# Patient Record
Sex: FEMALE | Race: WHITE | NOT HISPANIC OR LATINO | URBAN - METROPOLITAN AREA
[De-identification: names, ages, dates, MRNs, and addresses within clinical notes are randomized per-mention and may not be internally consistent; named-entity substitution may affect disease eponyms.]

---

## 2017-03-01 ENCOUNTER — OFFICE VISIT - HEALTHEAST (OUTPATIENT)
Dept: INTERNAL MEDICINE | Facility: CLINIC | Age: 57
End: 2017-03-01

## 2017-03-01 DIAGNOSIS — J11.1 INFLUENZA-LIKE ILLNESS: ICD-10-CM

## 2017-03-01 DIAGNOSIS — E66.09 NON MORBID OBESITY DUE TO EXCESS CALORIES: ICD-10-CM

## 2017-03-01 DIAGNOSIS — F41.9 ANXIETY: ICD-10-CM

## 2017-03-01 ASSESSMENT — MIFFLIN-ST. JEOR: SCORE: 1293.03

## 2017-03-02 ENCOUNTER — OFFICE VISIT - HEALTHEAST (OUTPATIENT)
Dept: INTERNAL MEDICINE | Facility: CLINIC | Age: 57
End: 2017-03-02

## 2017-03-02 DIAGNOSIS — F33.0 MILD EPISODE OF RECURRENT MAJOR DEPRESSIVE DISORDER (H): ICD-10-CM

## 2017-03-02 DIAGNOSIS — F41.9 ANXIETY: ICD-10-CM

## 2017-03-02 ASSESSMENT — MIFFLIN-ST. JEOR: SCORE: 1288.49

## 2017-03-28 ENCOUNTER — COMMUNICATION - HEALTHEAST (OUTPATIENT)
Dept: SCHEDULING | Facility: CLINIC | Age: 57
End: 2017-03-28

## 2017-03-29 ENCOUNTER — COMMUNICATION - HEALTHEAST (OUTPATIENT)
Dept: INTERNAL MEDICINE | Facility: CLINIC | Age: 57
End: 2017-03-29

## 2017-04-10 ENCOUNTER — COMMUNICATION - HEALTHEAST (OUTPATIENT)
Dept: INTERNAL MEDICINE | Facility: CLINIC | Age: 57
End: 2017-04-10

## 2017-04-10 ENCOUNTER — OFFICE VISIT - HEALTHEAST (OUTPATIENT)
Dept: INTERNAL MEDICINE | Facility: CLINIC | Age: 57
End: 2017-04-10

## 2017-04-10 DIAGNOSIS — M79.603 ARM PAIN: ICD-10-CM

## 2017-04-10 ASSESSMENT — MIFFLIN-ST. JEOR: SCORE: 1322.51

## 2017-04-12 ENCOUNTER — COMMUNICATION - HEALTHEAST (OUTPATIENT)
Dept: INTERNAL MEDICINE | Facility: CLINIC | Age: 57
End: 2017-04-12

## 2017-04-28 ENCOUNTER — RECORDS - HEALTHEAST (OUTPATIENT)
Dept: ADMINISTRATIVE | Facility: OTHER | Age: 57
End: 2017-04-28

## 2017-05-05 ENCOUNTER — COMMUNICATION - HEALTHEAST (OUTPATIENT)
Dept: INTERNAL MEDICINE | Facility: CLINIC | Age: 57
End: 2017-05-05

## 2017-07-21 ENCOUNTER — AMBULATORY - HEALTHEAST (OUTPATIENT)
Dept: MULTI SPECIALTY CLINIC | Facility: CLINIC | Age: 57
End: 2017-07-21

## 2017-07-21 LAB
HPV_EXT - HISTORICAL: NORMAL
PAP SMEAR - HIM PATIENT REPORTED: NORMAL

## 2018-09-13 ENCOUNTER — OFFICE VISIT - HEALTHEAST (OUTPATIENT)
Dept: INTERNAL MEDICINE | Facility: CLINIC | Age: 58
End: 2018-09-13

## 2018-09-13 DIAGNOSIS — F33.0 MILD EPISODE OF RECURRENT MAJOR DEPRESSIVE DISORDER (H): ICD-10-CM

## 2018-09-13 DIAGNOSIS — Z00.00 HEALTHCARE MAINTENANCE: ICD-10-CM

## 2018-09-13 ASSESSMENT — MIFFLIN-ST. JEOR: SCORE: 1327.04

## 2018-10-16 ENCOUNTER — COMMUNICATION - HEALTHEAST (OUTPATIENT)
Dept: INTERNAL MEDICINE | Facility: CLINIC | Age: 58
End: 2018-10-16

## 2018-10-17 ENCOUNTER — AMBULATORY - HEALTHEAST (OUTPATIENT)
Dept: INTERNAL MEDICINE | Facility: CLINIC | Age: 58
End: 2018-10-17

## 2018-10-17 DIAGNOSIS — Z22.7 LATENT TUBERCULOSIS INFECTION: ICD-10-CM

## 2018-10-24 ENCOUNTER — COMMUNICATION - HEALTHEAST (OUTPATIENT)
Dept: INTERNAL MEDICINE | Facility: CLINIC | Age: 58
End: 2018-10-24

## 2018-10-26 ENCOUNTER — OFFICE VISIT - HEALTHEAST (OUTPATIENT)
Dept: INTERNAL MEDICINE | Facility: CLINIC | Age: 58
End: 2018-10-26

## 2018-10-26 DIAGNOSIS — Z00.00 HEALTHCARE MAINTENANCE: ICD-10-CM

## 2018-10-26 DIAGNOSIS — Z12.11 SCREEN FOR COLON CANCER: ICD-10-CM

## 2018-10-26 ASSESSMENT — MIFFLIN-ST. JEOR: SCORE: 1327.04

## 2018-10-29 ENCOUNTER — COMMUNICATION - HEALTHEAST (OUTPATIENT)
Dept: INTERNAL MEDICINE | Facility: CLINIC | Age: 58
End: 2018-10-29

## 2018-10-29 LAB — VZV IGG SER QL IA: POSITIVE

## 2018-10-30 ENCOUNTER — COMMUNICATION - HEALTHEAST (OUTPATIENT)
Dept: INTERNAL MEDICINE | Facility: CLINIC | Age: 58
End: 2018-10-30

## 2018-11-23 ENCOUNTER — COMMUNICATION - HEALTHEAST (OUTPATIENT)
Dept: INTERNAL MEDICINE | Facility: CLINIC | Age: 58
End: 2018-11-23

## 2018-11-26 ENCOUNTER — COMMUNICATION - HEALTHEAST (OUTPATIENT)
Dept: SCHEDULING | Facility: CLINIC | Age: 58
End: 2018-11-26

## 2019-06-16 ENCOUNTER — RECORDS - HEALTHEAST (OUTPATIENT)
Dept: SCHEDULING | Facility: CLINIC | Age: 59
End: 2019-06-16

## 2019-11-08 ENCOUNTER — COMMUNICATION - HEALTHEAST (OUTPATIENT)
Dept: SCHEDULING | Facility: CLINIC | Age: 59
End: 2019-11-08

## 2019-12-30 ENCOUNTER — OFFICE VISIT - HEALTHEAST (OUTPATIENT)
Dept: INTERNAL MEDICINE | Facility: CLINIC | Age: 59
End: 2019-12-30

## 2019-12-30 DIAGNOSIS — Z12.11 SCREEN FOR COLON CANCER: ICD-10-CM

## 2019-12-30 DIAGNOSIS — Z00.00 HEALTHCARE MAINTENANCE: ICD-10-CM

## 2019-12-30 DIAGNOSIS — F32.A DEPRESSION, UNSPECIFIED DEPRESSION TYPE: ICD-10-CM

## 2019-12-30 DIAGNOSIS — F41.9 ANXIETY: ICD-10-CM

## 2019-12-30 LAB
ALBUMIN SERPL-MCNC: 4.2 G/DL (ref 3.5–5)
ALP SERPL-CCNC: 84 U/L (ref 45–120)
ALT SERPL W P-5'-P-CCNC: 13 U/L (ref 0–45)
ANION GAP SERPL CALCULATED.3IONS-SCNC: 13 MMOL/L (ref 5–18)
AST SERPL W P-5'-P-CCNC: 16 U/L (ref 0–40)
BILIRUB SERPL-MCNC: 0.5 MG/DL (ref 0–1)
BUN SERPL-MCNC: 12 MG/DL (ref 8–22)
CALCIUM SERPL-MCNC: 9.4 MG/DL (ref 8.5–10.5)
CHLORIDE BLD-SCNC: 106 MMOL/L (ref 98–107)
CHOLEST SERPL-MCNC: 247 MG/DL
CO2 SERPL-SCNC: 23 MMOL/L (ref 22–31)
CREAT SERPL-MCNC: 0.93 MG/DL (ref 0.6–1.1)
FASTING STATUS PATIENT QL REPORTED: ABNORMAL
GFR SERPL CREATININE-BSD FRML MDRD: >60 ML/MIN/1.73M2
GLUCOSE BLD-MCNC: 138 MG/DL (ref 70–125)
HDLC SERPL-MCNC: 54 MG/DL
LDLC SERPL CALC-MCNC: 163 MG/DL
POTASSIUM BLD-SCNC: 3.4 MMOL/L (ref 3.5–5)
PROT SERPL-MCNC: 8 G/DL (ref 6–8)
SODIUM SERPL-SCNC: 142 MMOL/L (ref 136–145)
TRIGL SERPL-MCNC: 152 MG/DL

## 2019-12-30 ASSESSMENT — PATIENT HEALTH QUESTIONNAIRE - PHQ9: SUM OF ALL RESPONSES TO PHQ QUESTIONS 1-9: 1

## 2019-12-30 ASSESSMENT — MIFFLIN-ST. JEOR: SCORE: 1352

## 2019-12-31 ENCOUNTER — COMMUNICATION - HEALTHEAST (OUTPATIENT)
Dept: INTERNAL MEDICINE | Facility: CLINIC | Age: 59
End: 2019-12-31

## 2020-01-02 ENCOUNTER — COMMUNICATION - HEALTHEAST (OUTPATIENT)
Dept: INTERNAL MEDICINE | Facility: CLINIC | Age: 60
End: 2020-01-02

## 2020-01-03 ENCOUNTER — COMMUNICATION - HEALTHEAST (OUTPATIENT)
Dept: INTERNAL MEDICINE | Facility: CLINIC | Age: 60
End: 2020-01-03

## 2020-03-26 ENCOUNTER — RECORDS - HEALTHEAST (OUTPATIENT)
Dept: SCHEDULING | Facility: CLINIC | Age: 60
End: 2020-03-26

## 2020-04-28 ENCOUNTER — COMMUNICATION - HEALTHEAST (OUTPATIENT)
Dept: SCHEDULING | Facility: CLINIC | Age: 60
End: 2020-04-28

## 2020-05-04 ENCOUNTER — OFFICE VISIT - HEALTHEAST (OUTPATIENT)
Dept: INTERNAL MEDICINE | Facility: CLINIC | Age: 60
End: 2020-05-04

## 2020-05-04 DIAGNOSIS — R22.42 LEG MASS, LEFT: ICD-10-CM

## 2020-05-27 ENCOUNTER — COMMUNICATION - HEALTHEAST (OUTPATIENT)
Dept: INTERNAL MEDICINE | Facility: CLINIC | Age: 60
End: 2020-05-27

## 2021-05-25 ENCOUNTER — RECORDS - HEALTHEAST (OUTPATIENT)
Dept: ADMINISTRATIVE | Facility: CLINIC | Age: 61
End: 2021-05-25

## 2021-05-26 ASSESSMENT — PATIENT HEALTH QUESTIONNAIRE - PHQ9: SUM OF ALL RESPONSES TO PHQ QUESTIONS 1-9: 1

## 2021-05-30 VITALS — HEIGHT: 61 IN | WEIGHT: 170 LBS | BODY MASS INDEX: 32.1 KG/M2

## 2021-05-30 VITALS — BODY MASS INDEX: 32.28 KG/M2 | HEIGHT: 61 IN | WEIGHT: 171 LBS

## 2021-05-30 VITALS — BODY MASS INDEX: 32.02 KG/M2 | WEIGHT: 174 LBS | HEIGHT: 62 IN

## 2021-06-02 VITALS — BODY MASS INDEX: 32.2 KG/M2 | HEIGHT: 62 IN | WEIGHT: 175 LBS

## 2021-06-02 VITALS — WEIGHT: 175 LBS | HEIGHT: 62 IN | BODY MASS INDEX: 32.2 KG/M2

## 2021-06-03 NOTE — TELEPHONE ENCOUNTER
Dr. Jhaveri,  Spoke with the patient and relayed message below.  She states that she needs a letter for her employer that states it is okay for her to work until her physical on 11/20/19.  Kanwal JACQUES CMA/DEVI....................1:36 PM

## 2021-06-03 NOTE — TELEPHONE ENCOUNTER
Who is requesting the letter?  Patient  Why is the letter needed? To continue working until has PE on 11/20/19  How would you like this letter returned? Employer is requesting letter stating that patient is clear to work until has PE on 11/20/19  Okay to leave a detailed message? Yes    Patient is asking if able to put letter on my chart for her to pull off from there.

## 2021-06-03 NOTE — TELEPHONE ENCOUNTER
I am still confused.  I have not seen her in over a year.  I am not quite sure why she needs a letter saying she can work until her physical since I have no idea what her current situation is I do not know that I can produce such a letter.

## 2021-06-03 NOTE — TELEPHONE ENCOUNTER
Per verbal communication with Dr. Jhaveri, letter has been set up for him to review.    Spoke with the patient and let her know that the letter has been sent to her my chart.  As well as we have mailed the letter to her home.  Patient had no further questions at this time.    Kanwal JACQUES CMA/DEVI....................2:38 PM

## 2021-06-03 NOTE — TELEPHONE ENCOUNTER
This message makes no sense to me.  I am not sure what this letter is for why it is needed.  I need more information.

## 2021-06-04 VITALS — HEIGHT: 61 IN | HEART RATE: 99 BPM | OXYGEN SATURATION: 95 % | WEIGHT: 184 LBS | BODY MASS INDEX: 34.74 KG/M2

## 2021-06-04 NOTE — TELEPHONE ENCOUNTER
Ailyn Brown for a letter for the patient as requested below.  Please advise.  Thank you.  Kanwal JACQUES, MILTON/CMT....................10:28 AM

## 2021-06-04 NOTE — TELEPHONE ENCOUNTER
"Who is calling:  pt  Reason for Call:  PT said letter has to include the verbiage \"Clear for work\".  Please contact pt back today .  Pt said she has to have this letter today with that in it.  Okay to leave a detailed message: Yes      "

## 2021-06-04 NOTE — PATIENT INSTRUCTIONS - HE
Patient Education   Your Health Risk Assessment indicates you feel you are not in good emotional health.    Recreation   Recreation is not limited to sports and team events. It includes any activity that provides relaxation, interest, enjoyment, and exercise. Recreation provides an outlet for physical, mental, and social energy. It can give a sense of worth and achievement. It can help you stay healthy.    Mental Exercise and Social Involvement  Mental and emotional health is as important as physical health. Keep in touch with friends and family. Stay as active as possible. Continue to learn and challenge yourself.   Things you can do to stay mentally active are:    Learn something new, like a foreign language or musical instrument.     Play SCRABBLE or do crossword puzzles. If you cannot find people to play these games with you at home, you can play them with others on your computer through the Internet.     Join a games club--anything from card games to chess or checkers or lawn bowling.     Start a new hobby.     Go back to school.     Volunteer.     Read.     Keep up with world events.         Advance Directive  Patient s advance directive was discussed and I am comfortable with the patient s wishes.  Patient Education   Personalized Prevention Plan  You are due for the preventive services outlined below.  Your care team is available to assist you in scheduling these services.  If you have already completed any of these items, please share that information with your care team to update in your medical record.  Health Maintenance   Topic Date Due     HEPATITIS C SCREENING  1960     HIV SCREENING  12/31/1977     MEDICARE ANNUAL WELLNESS VISIT  12/31/1980     LIPID  12/31/2007     COLONOSCOPY  12/31/2012     ZOSTER VACCINES (1 of 2) 12/31/2012     INFLUENZA VACCINE RULE BASED (1) 08/01/2019     ADVANCE CARE PLANNING  03/01/2022     PAP SMEAR  07/21/2022     HPV TEST  07/21/2022     TD 18+ HE  09/27/2026      TDAP ADULT ONE TIME DOSE  Completed     MAMMOGRAM  Discontinued

## 2021-06-04 NOTE — TELEPHONE ENCOUNTER
Who is calling:  The patient  Reason for Call:  She would like a letter stating that she has had her physical with the date on it.  Date of last appointment with primary care: 12-30-19  Okay to leave a detailed message: Yes

## 2021-06-04 NOTE — TELEPHONE ENCOUNTER
Test Results  Who is calling?:  Patient  Who ordered the test:  Dr Jhaveri  Type of test: Imaging  Date of test:  12/30/2019  Where was the test performed:  [facility]  What are your questions/concerns?:    Informed of Dr Jhaveri's message listed below.  Patient states understanding.  Patient requests the Chest Xray results to be sent to My Chart.      Result Notes for XR Chest 2 Views     Notes recorded by Andrae Jhaveri MD on 12/30/2019 at 4:00 PM CST  Please let her know that her chest x-ray is negative and to forward the report onto wherever it is she asked us to send it.         Okay to leave a detailed message?:  Yes, please send results to My Chart.

## 2021-06-04 NOTE — TELEPHONE ENCOUNTER
Dr. Jhaveri,  Letter has been set up for you to review.  Kanwal JACQUES, CMA/CMT....................2:28 PM

## 2021-06-04 NOTE — TELEPHONE ENCOUNTER
Spoke with the patient and relayed message below from Dr. Jhaveri.  She verbalized understanding and had no further questions at this time.    Letter has been set up for Dr. Jhaveri to review.  Kanwal JACQUES CMA/DEVI....................11:46 AM

## 2021-06-07 NOTE — PROGRESS NOTES
"Rosey Enriquez is a 59 y.o. female who is being evaluated via a billable telephone visit.      The patient has been notified of following:     \"This telephone visit will be conducted via a call between you and your physician/provider. We have found that certain health care needs can be provided without the need for a physical exam.  This service lets us provide the care you need with a short phone conversation.  If a prescription is necessary we can send it directly to your pharmacy.  If lab work is needed we can place an order for that and you can then stop by our lab to have the test done at a later time.    Telephone visits are billed at different rates depending on your insurance coverage. During this emergency period, for some insurers they may be billed the same as an in-person visit.  Please reach out to your insurance provider with any questions.    If during the course of the call the physician/provider feels a telephone visit is not appropriate, you will not be charged for this service.\"    Patient has given verbal consent to a Telephone visit? Yes    What phone number would you like to be contacted at? [phone]    Patient would like to receive their AVS by AVS Preference: Mail a copy.    Additional provider notes: See note. This is a 59-year-old woman called in for a visit today because of a mass behind the knee in the left leg.  She reports that this is been there for over 1 month.  She feels that it is getting larger.  It is not red or hot to the touch.  She is able to walk and is been going about her normal activities.  She does find it uncomfortable however.  Again, it seems to be increasing in size.  The left knee.  She otherwise seems to be feeling pretty much her usual self.    Assessment/Plan:    Left leg mass.  Based upon her description and the location this sounds as though it could be a Baker's cyst.  I discussed this with her and suggested she could be seen at 1 of the orthopedic " walk-in clinics this week or I could probably see her next week when I am doing face-to-face visits with patients.  She would prefer to see me next week so we will see what we need to do to get that appointment set up for her.  If that does not work out she should be seen at the orthopedic walk-in clinic.    Phone call duration:  8 minutes    Nanci Morales, [facility]

## 2021-06-07 NOTE — TELEPHONE ENCOUNTER
RN Triage:    Patient calling with suspected COVID. 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   Says she also had a rash around her nose but is gone now.    Denies all other symptoms: fever, cough, shortness of breath, chest pain      Patient has a history of cancer and is immunocompromised.      PLAN:  Per protocol, patient should monitor her symptoms at home. Informed patient of the testing sites and their current guidelines for testing. Patient verbalized understanding. Encouraged patient to call back if symptoms get worse or if questions arise.      Criselda Nunez RN    Reason for Disposition    COVID-19 Testing, questions about    1] COVID-19 infection diagnosed or suspected AND [2] mild symptoms (fever, cough) AND [2] no trouble breathing or other complications    [facility] Specific Disposition - REQUIRED: [facility] Specific Patient Instructions  COVID 19 Nurse Triage Plan/Patient Instructions    Please be aware that novel coronavirus (COVID-19) may be circulating in the community. If you develop symptoms such as fever, cough, or SOB or if you have concerns about the presence of another infection including coronavirus (COVID-19), please contact your health care provider or visit [email or web address].       Patient to stay at home and follow home care protocol based instructions. and Additional COVID19 information to add for patients.     Additional General Information About COVID-19    COVID-19 - General Information:  Regardless of if you have been tested or not:  Patient who have symptoms (cough, fever, or shortness of breath), need to isolate for 7 days from when symptoms started AND 72 hours after fever resolves (without fever reducing medications) AND improvement of respiratory symptoms (whichever is longer).    Isolate yourself at home (in own  room/own bathroom if possible)  Do Not allow any visitors  Do Not go to work or school  Do Not go to [employer],  centers, shopping, or other public places.  Do Not shake hands.  Avoid close and intimate contact with others (hugging, kissing).  Follow CDC recommendations for household cleaning of frequently touched services.     After the initial 7 days, continue to isolate yourself from household members as much as possible. To continue decrease the risk of community spread and exposure, you and any members of your household should limit activities in public for 14 days after starting home isolation.     You can reference the following CDC link for helpful home isolation/care tips:  https://www.cdc.gov/coronavirus/2019-ncov/downloads/10Things.pdf    COVID-19 - Symptoms:   The COVID-19 can cause a respiratory illness, such as bronchitis or pneumonia.  The most common symptoms are: cough, fever, and shortness of breath.   Other symptoms are: body aches, chills, diarrhea, fatigue, headache, runny nose, and sore throat     COVID-19 - Exposure Risk Factors:  Exposure to a person who has been diagnosed with COVID-19 .  Travel from an area with recent local transmission of COVID-19 .  The CDC (www.cdc.gov) has the most up-to-date list of where the COVID-19 outbreak is occurring.    COVID-19 - Spreading:   The virus likely spreads through respiratory droplets produced when a person coughs or sneezes. These respiratory droplets can travel approximately 6 feet and can remain on surfaces.  Common disinfectants will kill the virus.  The CDC currently does not recommend healthy people wearing masks.    COVID-19 - Protect Yourself:   Avoid close contact with people known to have this new COVID-19 infection.  Wash hands often with soap and water or alcohol-based hand .  Avoid touching the eyes, nose or mouth.     Thank you for limiting contact with others, wearing a simple mask to cover your cough, practice good  hand hygiene habits and accessing our virtual services where possible to limit the spread of this virus.    For more information about COVID19 and options for caring for yourself at home, please visit the CDC website at https://www.cdc.gov/coronavirus/2019-ncov/about/steps-when-sick.html  For more options for care at [facility], please visit our website at [email or web address]    For more information, please use the [place] Department of Health ([facility]) COVID-19 Hotlines (Interpreters available):   Health questions: Phone Number: [phone] or 1-422.293.3049 and Hours: 7 a.m. to 7 p.m.  Schools and  questions: Phone Number: [phone] or 1-903.974.4808 and Hours 7 a.m. to 7 p.m.    Protocols used: CORONAVIRUS (COVID-19) DIAGNOSED OR [id number] 3.30.20

## 2021-06-08 NOTE — TELEPHONE ENCOUNTER
Who is calling:  Patient   Reason for Call:  Caller is checking to see if Andrae Jhaveri MD received the form. Caller stated that she faxed it and for got to put her information on it. Caller stated that if form was not found please give her a call back as soon as possible.   Date of last appointment with primary care:   Okay to leave a detailed message: Yes  [phone]

## 2021-06-08 NOTE — TELEPHONE ENCOUNTER
Left detailed message for the patient relaying message below from Dr. Jhaveri.  Asked that she call with any further questions.  Kanwal JACQUES CMA/DEVI....................1:27 PM

## 2021-06-09 NOTE — PROGRESS NOTES
Office Visit - Follow Up   Rosey Enriquez   56 y.o. female    Date of Visit: 3/1/2017    Chief Complaint   Patient presents with     Cough        Assessment and Plan   1. Influenza-like illness  We discussed symptomatic measures, she seems to be improving no further testing.  Note given for work.    2. Anxiety  She is on fluoxetine, eyes discussed that she could increase this to 40 mg if she would like.  We also discussed cognitive Behavioral therapy.  She wants to continue on current dose of fluoxetine and discussed with Dr. Andrae Jhaveri in a follow-up visit.    3. Non morbid obesity due to excess calories  The following high BMI interventions were performed this visit: encouragement to exercise and lifestyle education regarding diet    Return in about 4 weeks (around 3/29/2017) for follow up with PCP.     History of Present Illness   This 56 y.o. old woman comes in for evaluation of fever, chills, body ache, cough, sore throat, nausea and vomiting.  This all started about last Thursday.  It x-ray seems to be improving now.  She has a cough but it's also improving.  Shortness of breath is minimal.  No further fever or body aches.  Vomiting resolved.  No history of gastric surgery.  She does take fluoxetine for anxiety and depression.  Currently she states she is a bit anxious.  She's been traveling to [facility] to work and this has been a bit stressful.  She wonders if there is anything she can do differently to manage her anxiety.  Additionally, she has not been successful at weight loss.  She did try vegan diet but with her new work she has been eating less purposefully.     Review of Systems: A comprehensive review of systems was negative except as noted.     Medications, Allergies and Problem List   Reviewed and updated     Physical Exam   General Appearance:   No acute distress    Visit Vitals     /82 (Patient Site: Left Arm, Patient Position: Sitting, Cuff Size: Adult Regular)     Pulse 78  "    Ht 5' 1\" (1.549 m)     Wt 171 lb (77.6 kg)     SpO2 96%     BMI 32.31 kg/m2       HEENT exam is unremarkable neck supple no cervical lymphadenopathy she is no thyromegaly or thyroid nodules.  Cardiovascular regular rate and rhythm no murmur, rub lungs clear to auscultation bilaterally, no wheezing, abdomen is obese, soft nontender nondistended no organomegaly.  She has no CVA tenderness.  She has no hot or swollen joints no joint deformity.  She has no frontal or skin lesions.  Psychiatric she is pleasant and appropriate no acute distress neurologic symptoms nonfocal      Additional Information   Current Outpatient Prescriptions   Medication Sig Dispense Refill     FLUoxetine (PROZAC) 20 MG capsule Take 20 mg by mouth.       No current facility-administered medications for this visit.      Allergies   Allergen Reactions     Codeine Hives and Itching     Social History   Substance Use Topics     Smoking status: Never Smoker     Smokeless tobacco: None     Alcohol use No       Review and/or order of clinical lab tests:  Review and/or order of radiology tests:  Review and/or order of medicine tests:  Discussion of test results with performing physician:  Decision to obtain old records and/or obtain history from someone other than the patient:  Review and summarization of old records and/or obtaining history from someone other than the patient and.or discussion of case with another health care provider:  Independent visualization of image, tracing or specimen itself:    Time:      nAdrae Estrella MD  "

## 2021-06-09 NOTE — PROGRESS NOTES
[facility] Follow Up Note    Rosey Enriquez   56 y.o. female    Date of Visit: 3/2/2017    Chief Complaint   Patient presents with     Anxiety     would like to take something for it     Subjective  This is a 56-year-old lady with a history of depression and anxiety.  She had been taking Prozac and her symptoms were under reasonably good control.  For reasons that I do not completely understand she stopped her medication about 4 months ago and is now having issues with her depression and anxiety.  Her symptoms are pretty much as they have been in the past with this problem and so she is asking today if anything can be done or if we should simply restart her medication.  Otherwise she says she is doing well.    ROS A comprehensive review of systems was performed and was otherwise negative    Medications, allergies, and problem list were reviewed and updated    Exam  General Appearance:   On examination her blood pressure is 138/70.  Weight is 170 pounds and height is 61 inches.  BMI is 32.12.    Heart is in a sinus rhythm with a rate of 82 and no ectopy.    The patient is alert and oriented ×3.  She answers questions appropriately and her mood and affect seem appropriate as well.      Assessment/Plan  1. Mild episode of recurrent major depressive disorder     2. Anxiety       This is a lady with chronic depression and anxiety issues who seems to have an exacerbation probably because she stopped her medication.  She and I did discuss this at length.  I would recommend resuming her Prozac at 20 mg daily.  I did ask her to call me if she does not think this is helping.  The following high BMI interventions were performed this visit: weight monitoring    Andrae Jhaveri MD      Current Outpatient Prescriptions on File Prior to Visit   Medication Sig     [DISCONTINUED] FLUoxetine (PROZAC) 20 MG capsule Take 20 mg by mouth.     No current facility-administered medications on file prior to visit.       Allergies   Allergen Reactions     Codeine Hives and Itching     Social History   Substance Use Topics     Smoking status: Never Smoker     Smokeless tobacco: None     Alcohol use No

## 2021-06-09 NOTE — PROGRESS NOTES
[facility] Follow Up Note    Rosey Enriquez   56 y.o. female    Date of Visit: 4/10/2017    Chief Complaint   Patient presents with     Arm Injury     broke arm at work     Subjective  This is a 56-year-old lady who is in because of ongoing pain in the vicinity of her right elbow.  She took a fall about 1 month ago and has had persistent pain in this area.  She has worn an elastic support and kept it in a sling but the pain persists and use of the right arm is limited because of the pain.  She thought she would come in and see what my recommendation would be relative to the current pain issue.  She has otherwise been feeling fine and offers no other particular complaints.  She has not been using the right arm and does not do very well with her left arm and she is right hand dominant.    ROS A comprehensive review of systems was performed and was otherwise negative    Medications, allergies, and problem list were reviewed and updated    Exam  General Appearance:   On examination her blood pressure was 124/60.  Weight is 174 pounds and height is 62 inches.  BMI is 31.83.    Heart is in a sinus rhythm with a rate of 90 and no ectopy.    Examination of the right arm shows no particular swelling.  Skin temperature is normal.  Passive range of motion is normal.  She is tender around the elbow region.    The patient is alert and oriented ×3.      Assessment/Plan  1. Arm pain  Ambulatory referral to Orthopedic Surgery     Persistent right arm pain in the vicinity of the elbow since a fall.  I think it is time for her to see the orthopedic doctor.  We will set up that referral.  In addition I did write her a note for work suggesting she be restricted to duties that do not includes significant arm movement.  This would apply until we have the orthopedic assessment completed.  The following high BMI interventions were performed this visit: weight monitoring    Andrae Jhaveri MD      Current  Outpatient Prescriptions on File Prior to Visit   Medication Sig     FLUoxetine (PROZAC) 20 MG capsule Take 1 capsule (20 mg total) by mouth daily.     No current facility-administered medications on file prior to visit.      Allergies   Allergen Reactions     Codeine Hives and Itching     Social History   Substance Use Topics     Smoking status: Never Smoker     Smokeless tobacco: None     Alcohol use No

## 2021-06-19 NOTE — LETTER
Letter by Andrae Jhaveri MD at      Author: Andrae Jhaveri MD Service: -- Author Type: --    Filed:  Encounter Date: 11/8/2019 Status: Signed         November 8, 2019     Patient: Rosey Enriquez   YOB: 1960   Date of Visit: 11/8/2019       To Whom It May Concern:    It is my medical opinion that Rosey Enriquez my work until her physical on 11/20/19.  At that time, we can further assess if any restrictions are necessary.    If you have any questions or concerns, please don't hesitate to call.    Sincerely,        Electronically signed by Andrae Jhaveri MD

## 2021-06-20 NOTE — LETTER
Letter by Andrae Jhaveri MD at      Author: Andrae Jhaveri MD Service: -- Author Type: --    Filed:  Encounter Date: 1/3/2020 Status: Signed         January 3, 2020     Patient: Rosey Enriquez   YOB: 1960   Date of Visit: 1/3/2020       To Whom It May Concern:    Rosey Enriquez was in clinic for a physical on 12/30/2019.    If you have any questions or concerns, please don't hesitate to call.    Sincerely,        Electronically signed by Andrae Jhaveri MD

## 2021-06-20 NOTE — LETTER
Letter by Andrae Jhaveri MD at      Author: Andrae Jhaveri MD Service: -- Author Type: --    Filed:  Encounter Date: 1/3/2020 Status: Signed         January 3, 2020     Patient: Rosey Enriquez   YOB: 1960   Date of Visit: 1/3/2020       To Whom It May Concern:    Rosey Enriquez was seen in clinic, for her physical on 12/30/2019.  She is cleared for work.    If you have any questions or concerns, please don't hesitate to call.    Sincerely,        Electronically signed by Andrae Jhaveri MD

## 2021-06-20 NOTE — LETTER
Letter by Andrae Jhaveri MD at      Author: Andrae Jhaveri MD Service: -- Author Type: --    Filed:  Encounter Date: 1/2/2020 Status: Signed         Rosey Simms[address] [place] [address]             January 2, 2020         Dear Ms. Enriquez,    Below are the results from your recent visit:    Resulted Orders   Comprehensive Metabolic Panel   Result Value Ref Range    Sodium 142 136 - 145 mmol/L    Potassium 3.4 (L) 3.5 - 5.0 mmol/L    Chloride 106 98 - 107 mmol/L    CO2 23 22 - 31 mmol/L    Anion Gap, Calculation 13 5 - 18 mmol/L    Glucose 138 (H) 70 - 125 mg/dL    BUN 12 8 - 22 mg/dL    Creatinine 0.93 0.60 - 1.10 mg/dL    GFR MDRD Af Amer >60 >60 mL/min/1.73m2    GFR MDRD Non Af Amer >60 >60 mL/min/1.73m2    Bilirubin, Total 0.5 0.0 - 1.0 mg/dL    Calcium 9.4 8.5 - 10.5 mg/dL    Protein, Total 8.0 6.0 - 8.0 g/dL    Albumin 4.2 3.5 - 5.0 g/dL    Alkaline Phosphatase 84 45 - 120 U/L    AST 16 0 - 40 U/L    ALT 13 0 - 45 U/L    Narrative    Fasting Glucose reference range is 70-99 mg/dL per  American Diabetes Association (ADA) guidelines.   Lipid Cascade   Result Value Ref Range    Cholesterol 247 (H) <=199 mg/dL    Triglycerides 152 (H) <=149 mg/dL    HDL Cholesterol 54 >=50 mg/dL    LDL Calculated 163 (H) <=129 mg/dL    Patient Fasting > 8hrs? Unknown        Your potassium is borderline, your sugar is a little elevated and your cholesterol numbers are quite high.  We should talk about these things.    Please call with questions or contact us using [email or web address]    Sincerely,        Electronically signed by Andrae Jhaveri MD

## 2021-06-20 NOTE — PROGRESS NOTES
[facility] Clinic Follow Up Note    Rosey Enriquez   57 y.o. female    Date of Visit: 9/13/2018    Chief Complaint   Patient presents with     Follow-up     school physical with paperwork-she needs clearance to work there     Subjective  This is a 57-year-old lady whose overall health is been stable.  I have not seen her in about 15 or 16 months.  She does have a history of depression and is currently on Prozac with good control of her symptoms.  She came in primarily today to have a form completed.  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  She needed a brief form completed certifying to her health and lack of communicable disease prior to doing some work in the hospital.  Again, she feels good.  She is working on losing weight.  She is now working out regularly and has hooked up with a  for additional exercises.  No complaints and no other changes in her medical history.    ROS A comprehensive review of systems was performed and was otherwise negative    Medications, allergies, and problem list were reviewed and updated    Exam  General Appearance:   On examination her blood pressure was a little borderline at 142/88.  Weight is 175 pounds and height is 62 inches.  BMI is 32.01.    Neck: Supple with no masses and no neck vein distention.  No thyroid enlargement.    Lungs: Clear.    Heart: Regular sinus rhythm with a rate of 95 and no ectopy.  I hear no gallops or murmurs.    No peripheral edema.    The patient is alert and oriented ×3.  Mood and affect are good.      Assessment/Plan  1. Mild episode of recurrent major depressive disorder (H)     2. Healthcare maintenance       Depression.  Is stable.  Continue current medication.    Healthcare maintenance.  Form was completed for her school work.  She seems to be doing well.  I will see her back when she is due for her next routine visit.  The following high BMI interventions were  performed this visit: encouragement to exercise and weight monitoring    Andrae Jhaveri MD      Current Outpatient Prescriptions on File Prior to Visit   Medication Sig     [DISCONTINUED] FLUoxetine (PROZAC) 20 MG capsule Take 1 capsule (20 mg total) by mouth daily.     No current facility-administered medications on file prior to visit.      Allergies   Allergen Reactions     Codeine Hives and Itching     Social History   Substance Use Topics     Smoking status: Never Smoker     Smokeless tobacco: Never Used     Alcohol use No

## 2021-06-21 NOTE — PROGRESS NOTES
[facility] Follow Up Note    Rosey Enriquez   57 y.o. female    Date of Visit: 10/26/2018    Chief Complaint   Patient presents with     Follow-up     needs a x-ray, blood work for varciella     Subjective  This is a 57-year-old lady who is generally healthy.  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  She needs a chest x-ray because of a long ago history of tuberculosis and she also needs a varicella titer.  She is also due for flu vaccine.  She feels good at this time and offers no complaints.  Depression is under good control.  No changes in medication and no other medical concerns.    ROS A comprehensive review of systems was performed and was otherwise negative    Medications, allergies, and problem list were reviewed and updated    Exam  General Appearance:   On examination her blood pressure is 120/60.  Weight is 175 pounds and height is 62 inches.  BMI is 32.01.    Heart is in sinus rhythm with a rate of 70 and no ectopy.    The patient is alert and oriented x3.      Assessment/Plan  1. Screen for colon cancer  Ambulatory referral for Colonoscopy   2. Healthcare maintenance  XR Chest 2 Views    Varicella Zoster Antibody, IgG     This is a lady whose health is generally stable.  We will proceed with requested x-ray and blood work for her new job.  We will also give her her flu vaccine today.  I will follow-up as needed.  The following high BMI interventions were performed this visit: weight monitoring    Andrae Jhaveri MD      Current Outpatient Prescriptions on File Prior to Visit   Medication Sig     FLUoxetine (PROZAC) 20 MG capsule Take 1 capsule (20 mg total) by mouth daily.     No current facility-administered medications on file prior to visit.      Allergies   Allergen Reactions     Codeine Hives and Itching     Social History   Substance Use Topics     Smoking status: Never Smoker     Smokeless tobacco: Never Used     Alcohol use No

## 2021-08-21 ENCOUNTER — HEALTH MAINTENANCE LETTER (OUTPATIENT)
Age: 61
End: 2021-08-21

## 2021-10-16 ENCOUNTER — HEALTH MAINTENANCE LETTER (OUTPATIENT)
Age: 61
End: 2021-10-16

## 2022-09-25 ENCOUNTER — HEALTH MAINTENANCE LETTER (OUTPATIENT)
Age: 62
End: 2022-09-25

## 2023-10-14 ENCOUNTER — HEALTH MAINTENANCE LETTER (OUTPATIENT)
Age: 63
End: 2023-10-14